# Patient Record
Sex: MALE | Race: WHITE | Employment: UNEMPLOYED | URBAN - METROPOLITAN AREA
[De-identification: names, ages, dates, MRNs, and addresses within clinical notes are randomized per-mention and may not be internally consistent; named-entity substitution may affect disease eponyms.]

---

## 2017-04-28 ENCOUNTER — HOSPITAL ENCOUNTER (EMERGENCY)
Facility: HOSPITAL | Age: 27
Discharge: HOME | End: 2017-04-29
Attending: EMERGENCY MEDICINE
Payer: MEDICAID

## 2017-04-28 VITALS
DIASTOLIC BLOOD PRESSURE: 58 MMHG | WEIGHT: 150 LBS | SYSTOLIC BLOOD PRESSURE: 119 MMHG | BODY MASS INDEX: 21 KG/M2 | TEMPERATURE: 98 F | HEART RATE: 85 BPM | RESPIRATION RATE: 20 BRPM | OXYGEN SATURATION: 99 % | HEIGHT: 71 IN

## 2017-04-28 DIAGNOSIS — R73.9 HYPERGLYCEMIA: Primary | ICD-10-CM

## 2017-04-28 PROCEDURE — 96372 THER/PROPH/DIAG INJ SC/IM: CPT

## 2017-04-28 PROCEDURE — 36415 COLL VENOUS BLD VENIPUNCTURE: CPT

## 2017-04-28 PROCEDURE — 80053 COMPREHEN METABOLIC PANEL: CPT | Performed by: EMERGENCY MEDICINE

## 2017-04-28 PROCEDURE — 82962 GLUCOSE BLOOD TEST: CPT

## 2017-04-28 PROCEDURE — 85025 COMPLETE CBC W/AUTO DIFF WBC: CPT | Performed by: EMERGENCY MEDICINE

## 2017-04-28 PROCEDURE — 81001 URINALYSIS AUTO W/SCOPE: CPT | Performed by: EMERGENCY MEDICINE

## 2017-04-28 PROCEDURE — 99285 EMERGENCY DEPT VISIT HI MDM: CPT

## 2017-04-28 PROCEDURE — 82009 KETONE BODYS QUAL: CPT | Performed by: EMERGENCY MEDICINE

## 2017-04-28 RX ORDER — INSULIN LISPRO 100 [IU]/ML
5 INJECTION, SOLUTION INTRAVENOUS; SUBCUTANEOUS
Qty: 10 ML | Refills: 0 | Status: SHIPPED | OUTPATIENT
Start: 2017-04-28 | End: 2017-04-28

## 2017-04-28 RX ORDER — INSULIN ASPART 100 [IU]/ML
0.2 INJECTION, SOLUTION INTRAVENOUS; SUBCUTANEOUS ONCE
Status: COMPLETED | OUTPATIENT
Start: 2017-04-28 | End: 2017-04-28

## 2017-04-28 RX ORDER — INSULIN LISPRO 100 [IU]/ML
5 INJECTION, SOLUTION INTRAVENOUS; SUBCUTANEOUS
Qty: 10 ML | Refills: 0 | Status: ON HOLD | OUTPATIENT
Start: 2017-04-28 | End: 2017-05-20

## 2017-04-29 PROCEDURE — 82962 GLUCOSE BLOOD TEST: CPT

## 2017-04-29 NOTE — ED PROVIDER NOTES
Patient Seen in: [facility] AND [facility] Emergency Department    History   Patient presents with:  Hyperglycemia (metabolic)    Stated Complaint: \"I need insulin\"    HPI    50-year-old male who is an insulin-dependent diabetic who currently takes 15 units of Cardiovascular: Normal rate, regular rhythm and intact distal pulses. Pulmonary/Chest: Effort normal. No respiratory distress. Clear breath sounds bilaterally. Abdominal: Soft. There is no tenderness. There is no guarding.    Musculoskeletal: Normal r DIFFERENTIAL[[id number]]          Abnormal            Final result                 Please view results for these tests on the individual orders. MDM     D/w Dr. Mena Venegas, rec'd admit as well. Pt adimantly does not want to stay. Understands risks.

## 2017-05-18 ENCOUNTER — HOSPITAL ENCOUNTER (INPATIENT)
Facility: HOSPITAL | Age: 27
LOS: 2 days | Discharge: HOME | DRG: 638 | End: 2017-05-20
Attending: EMERGENCY MEDICINE | Admitting: HOSPITALIST
Payer: MEDICAID

## 2017-05-18 DIAGNOSIS — E10.65 TYPE 1 DIABETES MELLITUS WITH HYPERGLYCEMIA (HCC): Primary | ICD-10-CM

## 2017-05-18 DIAGNOSIS — E10.10 DIABETIC KETOACIDOSIS WITHOUT COMA ASSOCIATED WITH TYPE 1 DIABETES MELLITUS (HCC): ICD-10-CM

## 2017-05-18 PROBLEM — E87.5 HYPERKALEMIA: Status: ACTIVE | Noted: 2017-05-18

## 2017-05-18 PROBLEM — E87.2 METABOLIC ACIDOSIS: Status: ACTIVE | Noted: 2017-05-18

## 2017-05-18 PROBLEM — R73.9 HYPERGLYCEMIA: Status: ACTIVE | Noted: 2017-05-18

## 2017-05-18 PROBLEM — E11.10 DKA (DIABETIC KETOACIDOSES): Status: ACTIVE | Noted: 2017-05-18

## 2017-05-18 PROCEDURE — 96365 THER/PROPH/DIAG IV INF INIT: CPT

## 2017-05-18 PROCEDURE — 96375 TX/PRO/DX INJ NEW DRUG ADDON: CPT

## 2017-05-18 PROCEDURE — 82009 KETONE BODYS QUAL: CPT | Performed by: EMERGENCY MEDICINE

## 2017-05-18 PROCEDURE — 83735 ASSAY OF MAGNESIUM: CPT | Performed by: INTERNAL MEDICINE

## 2017-05-18 PROCEDURE — 93010 ELECTROCARDIOGRAM REPORT: CPT | Performed by: EMERGENCY MEDICINE

## 2017-05-18 PROCEDURE — 85025 COMPLETE CBC W/AUTO DIFF WBC: CPT | Performed by: EMERGENCY MEDICINE

## 2017-05-18 PROCEDURE — 93005 ELECTROCARDIOGRAM TRACING: CPT

## 2017-05-18 PROCEDURE — 84484 ASSAY OF TROPONIN QUANT: CPT | Performed by: EMERGENCY MEDICINE

## 2017-05-18 PROCEDURE — 82962 GLUCOSE BLOOD TEST: CPT

## 2017-05-18 PROCEDURE — 81001 URINALYSIS AUTO W/SCOPE: CPT | Performed by: EMERGENCY MEDICINE

## 2017-05-18 PROCEDURE — 80048 BASIC METABOLIC PNL TOTAL CA: CPT | Performed by: INTERNAL MEDICINE

## 2017-05-18 PROCEDURE — 99285 EMERGENCY DEPT VISIT HI MDM: CPT

## 2017-05-18 PROCEDURE — 80307 DRUG TEST PRSMV CHEM ANLYZR: CPT | Performed by: HOSPITALIST

## 2017-05-18 PROCEDURE — 96361 HYDRATE IV INFUSION ADD-ON: CPT

## 2017-05-18 PROCEDURE — 80048 BASIC METABOLIC PNL TOTAL CA: CPT | Performed by: EMERGENCY MEDICINE

## 2017-05-18 PROCEDURE — 96366 THER/PROPH/DIAG IV INF ADDON: CPT

## 2017-05-18 RX ORDER — CALCIUM CARBONATE 200(500)MG
500 TABLET,CHEWABLE ORAL 3 TIMES DAILY PRN
Status: DISCONTINUED | OUTPATIENT
Start: 2017-05-18 | End: 2017-05-20

## 2017-05-18 RX ORDER — ONDANSETRON 2 MG/ML
4 INJECTION INTRAMUSCULAR; INTRAVENOUS EVERY 6 HOURS PRN
Status: DISCONTINUED | OUTPATIENT
Start: 2017-05-18 | End: 2017-05-20

## 2017-05-18 RX ORDER — POLYETHYLENE GLYCOL 3350 17 G/17G
17 POWDER, FOR SOLUTION ORAL DAILY PRN
Status: DISCONTINUED | OUTPATIENT
Start: 2017-05-18 | End: 2017-05-20

## 2017-05-18 RX ORDER — METOCLOPRAMIDE HYDROCHLORIDE 5 MG/ML
10 INJECTION INTRAMUSCULAR; INTRAVENOUS EVERY 8 HOURS PRN
Status: DISCONTINUED | OUTPATIENT
Start: 2017-05-18 | End: 2017-05-20

## 2017-05-18 RX ORDER — SODIUM CHLORIDE 9 MG/ML
INJECTION, SOLUTION INTRAVENOUS CONTINUOUS
Status: DISCONTINUED | OUTPATIENT
Start: 2017-05-18 | End: 2017-05-18

## 2017-05-18 RX ORDER — BISACODYL 10 MG
10 SUPPOSITORY, RECTAL RECTAL
Status: DISCONTINUED | OUTPATIENT
Start: 2017-05-18 | End: 2017-05-20

## 2017-05-18 RX ORDER — ONDANSETRON 2 MG/ML
4 INJECTION INTRAMUSCULAR; INTRAVENOUS ONCE
Status: COMPLETED | OUTPATIENT
Start: 2017-05-18 | End: 2017-05-18

## 2017-05-18 RX ORDER — HEPARIN SODIUM 5000 [USP'U]/ML
5000 INJECTION, SOLUTION INTRAVENOUS; SUBCUTANEOUS EVERY 8 HOURS SCHEDULED
Status: DISCONTINUED | OUTPATIENT
Start: 2017-05-18 | End: 2017-05-20

## 2017-05-18 RX ORDER — ACETAMINOPHEN 325 MG/1
650 TABLET ORAL EVERY 6 HOURS PRN
Status: DISCONTINUED | OUTPATIENT
Start: 2017-05-18 | End: 2017-05-20

## 2017-05-18 RX ORDER — SODIUM CHLORIDE AND POTASSIUM CHLORIDE .9; .15 G/100ML; G/100ML
SOLUTION INTRAVENOUS CONTINUOUS
Status: DISCONTINUED | OUTPATIENT
Start: 2017-05-18 | End: 2017-05-19

## 2017-05-18 RX ORDER — DOCUSATE SODIUM 100 MG/1
100 CAPSULE, LIQUID FILLED ORAL 2 TIMES DAILY PRN
Status: DISCONTINUED | OUTPATIENT
Start: 2017-05-18 | End: 2017-05-20

## 2017-05-18 NOTE — ED NOTES
Pt to ED c/o N/V, hyperglycemia since yesterday. Pt reports he has run out of insulin and has not used any in 2 days. Denies pain. NSR-ST on cardiac monitor. 0.9 NS infusing w/o.

## 2017-05-18 NOTE — PLAN OF CARE
Patient was acting suspicious about his back pack. He wanted the back pack with him in bed and when the PCT got the pt up to the bathroom, he asked for his back pack and the PCT gave it to him.  The pts IV was beeping and when I went to go check him in the

## 2017-05-18 NOTE — PLAN OF CARE
Diabetes/Glucose Control    • Glucose maintained within prescribed range Progressing        Patient/Family Goals    • Patient/Family Long Term Goal Progressing    • Patient/Family Short Term Goal Progressing          Pt on insulin drip, VSS, resting comfor

## 2017-05-18 NOTE — CONSULTS
[facility] HOSP - [facility]    Report of Endocrinology Consultation  [facility]    Zaira Kwan Patient Status:  Inpatient    2/10/1990 MRN [id number]   Location [facility] 2W/SW Attending Bimal Tuttle MD   [facility] Day # negative  Cardiovascular: negative  Gastrointestinal: nausea, vomiting  Genitourinary:negative  Integument/breast: negative  Hematologic/lymphatic: negative  Musculoskeletal:negative  Neurological: negative  Behavioral/Psych: negative  Endocrine: negative

## 2017-05-18 NOTE — DISCHARGE PLANNING
JUAREZ received for d/c planning. The patient was admitted to CCU today from home where he is self-care. He is insured and has access to medications and follow-up care. SW will assist with d/c planning pending recommendations.      Isis Hoover LCSW  x13

## 2017-05-18 NOTE — H&P
[facility] Hospitalist H&P     CC: Patient presents with:  Nausea/Vomiting/Diarrhea (gastrointestinal)       PCP: None Pcp, follows with clinic in [address], moving to the area soon, previously in [address]  Feels better after the insulin. Nothing made it worse. Otherwise denies pain.       Review of Systems  12 point systems reviewed, please see HPI for pertinent positives, otherwise negative    History     PMH  Past Medical History   Diagnosis Date   • Typ

## 2017-05-18 NOTE — ED INITIAL ASSESSMENT (HPI)
C/o \"im in dka\". States he has been vomiting since middle of night and has mid crampy abd pain. Denies fevers. Unknown bs at home.   Last checked bs 1 week ago

## 2017-05-19 PROCEDURE — 83036 HEMOGLOBIN GLYCOSYLATED A1C: CPT | Performed by: HOSPITALIST

## 2017-05-19 PROCEDURE — 80053 COMPREHEN METABOLIC PANEL: CPT | Performed by: HOSPITALIST

## 2017-05-19 PROCEDURE — 82962 GLUCOSE BLOOD TEST: CPT

## 2017-05-19 PROCEDURE — 83735 ASSAY OF MAGNESIUM: CPT | Performed by: INTERNAL MEDICINE

## 2017-05-19 PROCEDURE — 85025 COMPLETE CBC W/AUTO DIFF WBC: CPT | Performed by: HOSPITALIST

## 2017-05-19 RX ORDER — NICOTINE 21 MG/24HR
1 PATCH, TRANSDERMAL 24 HOURS TRANSDERMAL DAILY
Status: DISCONTINUED | OUTPATIENT
Start: 2017-05-19 | End: 2017-05-20

## 2017-05-19 RX ORDER — DEXTROSE AND SODIUM CHLORIDE 5; .45 G/100ML; G/100ML
INJECTION, SOLUTION INTRAVENOUS CONTINUOUS
Status: DISCONTINUED | OUTPATIENT
Start: 2017-05-19 | End: 2017-05-20

## 2017-05-19 NOTE — PAYOR COMM NOTE
Attending Physician: Lucero Thompson MD    Review Type: ADMISSION   Reviewer: Cailin Mast       Date: May 19, 2017 - 11:35 AM  Payor: LATOYA  Authorization Number: [id number]  Admit date: 5/18/2017  7:28 AM   Admitted from Emergency Dept.:     ED Proider N while in house    Patient and/or patient's family given opportunity to ask questions and note understanding and agreeing with therapeutic plan as outlined    Thank You,  Buffy Knowles MD  [facility] Hospitalist    Answering Service number: [phone]    HPI Psych: mood stable, cooperative  Neuro: No focal deficits    Diagnostic Data:    CBC/Chem  Recent Labs   Lab  05/18/17   0740   WBC  17.2*   HGB  14.4   MCV  91.8   PLT  271       Recent Labs   Lab  05/18/17   0740   NA  136   K  5.2*   CL  95   CO2  9* AM  -NPO    PPX;SCD, hep SQ    Dipso pending clinical course    Full code, confirmed    Prior ER records reviewed confirming patient's medical history and medications. Further recommendations pending patient's clinical course.   [facility] hospitalist to [facility] with regular rate and rhythm, No murmurs, rubs, gallops  Abd: Abdomen soft, nontender, nondistended, no organomegaly, bowel sounds present  MSK: Full range of motion in extremities, no clubbing, no cyanosis.   No Lower extremity edema  Skin: no rashes or le Heparin Sodium (Porcine) 5000 UNIT/ML injection 5,000 Units, 5,000 Units, Subcutaneous, Q8H [address]  •  docusate sodium (COLACE) cap 100 mg, 100 mg, Oral, BID PRN  •  PEG 3350 (MIRALAX) powder packet 17 g, 17 g, Oral, Daily PRN  •  bisacodyl (DULCOLAX) rectal Non-compliance  4. Elevated WBC  5.  Renal insufficiency    Plan:    IV saline as ordered  IV insulin  Electrolyte monitoring and replacement  D5 when glucose below 200    Thank you        Mariam Fry MD        5/19  Lissette Rodriguez is a 33year old m (37.4 °C)] 99.3 °F (37.4 °C)  Pulse:  [] 104  Resp:  [14-27] 25  BP: (115-147)/(64-94) 136/88 mmHg    Intake/Output:    Intake/Output Summary (Last 24 hours) at 05/19/17 0845  Last data filed at 05/19/17 0600    Gross per 24 hour    Intake    4278 ml  181*          Recent Labs    Lab  05/18/17   0740    TROP   0.01        Imaging:  No results found.

## 2017-05-19 NOTE — PLAN OF CARE
Diabetes/Glucose Control    • Glucose maintained within prescribed range Progressing    Pt remains on the insulin drip and titrated per protocol. accucheck done every hour.     METABOLIC/FLUID AND ELECTROLYTES - ADULT    • Electrolytes maintained within nor

## 2017-05-19 NOTE — DISCHARGE PLANNING
SEVERINO met with the patient at bedside. He lives with his girlfriend and is unemployed. They are planning to move to the area and he is interested free/discounted clinics and SEVERINO provided him with information on the [address].   He currently is seeing

## 2017-05-19 NOTE — PROGRESS NOTES
[facility] Hospitalist Progress Note     PCP: None Pcp->has someone but in [place], now follows with clinic in [place] [address], would like info on free clinics here    CC: Follow up       Assessment/Plan:       Wesly Maloney is a 33year old male with PMH sig f °C)  Pulse:  [] 104  Resp:  [14-27] 25  BP: (115-147)/(64-94) 136/88 mmHg    Intake/Output:    Intake/Output Summary (Last 24 hours) at 05/19/17 0845  Last data filed at 05/19/17 0600   Gross per 24 hour   Intake   4278 ml   Output   2200 ml   Net

## 2017-05-19 NOTE — PROGRESS NOTES
[facility]   Diabetes Education Note    [place]  Patient Status Inpatient    2/10/1990  Location [facility] 2W/SW  Attending Dorian Double, [address] Day # 1  PCP  None Pcp      Reason for Visit:  Diabetic Tad Alvares

## 2017-05-19 NOTE — PLAN OF CARE
Diabetes/Glucose Control    • Glucose maintained within prescribed range  Continue on insulin drip Progressing        METABOLIC/FLUID AND ELECTROLYTES - ADULT    • Electrolytes maintained within normal limits Progressing        SKIN/TISSUE INTEGRITY - ADUL

## 2017-05-19 NOTE — PROGRESS NOTES
[facility]    Endocrine Progress Note  [facility]    Kirstie Johnson Patient Status:  Inpatient    2/10/1990 MRN [id number]   Location [facility] 2W/SW Attending Kimberly Castaneda MD   Hosp Day # 1 PCP None Pcp warm        Results:     Lab Results  Component Value Date   WBC 14.4* 05/19/2017   HGB 13.9 05/19/2017   HCT 41.4 05/19/2017    05/19/2017   CREATSERUM 1.17 05/19/2017   BUN 11 05/19/2017    05/19/2017   K 4.0 05/19/2017    05/19/2017

## 2017-05-20 VITALS
OXYGEN SATURATION: 100 % | DIASTOLIC BLOOD PRESSURE: 82 MMHG | BODY MASS INDEX: 20 KG/M2 | TEMPERATURE: 99 F | HEART RATE: 91 BPM | RESPIRATION RATE: 27 BRPM | WEIGHT: 146.69 LBS | SYSTOLIC BLOOD PRESSURE: 131 MMHG

## 2017-05-20 PROCEDURE — 85025 COMPLETE CBC W/AUTO DIFF WBC: CPT | Performed by: HOSPITALIST

## 2017-05-20 PROCEDURE — 82962 GLUCOSE BLOOD TEST: CPT

## 2017-05-20 PROCEDURE — 80048 BASIC METABOLIC PNL TOTAL CA: CPT | Performed by: HOSPITALIST

## 2017-05-20 RX ORDER — SODIUM CHLORIDE AND POTASSIUM CHLORIDE .9; .15 G/100ML; G/100ML
SOLUTION INTRAVENOUS CONTINUOUS
Status: DISCONTINUED | OUTPATIENT
Start: 2017-05-20 | End: 2017-05-20

## 2017-05-20 RX ORDER — 0.9 % SODIUM CHLORIDE 0.9 %
VIAL (ML) INJECTION
Status: COMPLETED
Start: 2017-05-20 | End: 2017-05-20

## 2017-05-20 RX ORDER — INSULIN LISPRO 100 [IU]/ML
8 INJECTION, SOLUTION INTRAVENOUS; SUBCUTANEOUS
Qty: 10 ML | Refills: 0 | Status: SHIPPED | OUTPATIENT
Start: 2017-05-20

## 2017-05-20 RX ORDER — DEXTROSE MONOHYDRATE 25 G/50ML
50 INJECTION, SOLUTION INTRAVENOUS AS NEEDED
Status: DISCONTINUED | OUTPATIENT
Start: 2017-05-20 | End: 2017-05-20

## 2017-05-20 RX ORDER — INSULIN LISPRO 100 [IU]/ML
10 INJECTION, SOLUTION INTRAVENOUS; SUBCUTANEOUS
Qty: 10 ML | Refills: 0 | Status: SHIPPED | OUTPATIENT
Start: 2017-05-20 | End: 2017-05-20

## 2017-05-20 RX ORDER — SODIUM CHLORIDE 9 MG/ML
INJECTION, SOLUTION INTRAVENOUS CONTINUOUS
Status: DISCONTINUED | OUTPATIENT
Start: 2017-05-20 | End: 2017-05-20

## 2017-05-20 RX ORDER — POTASSIUM CHLORIDE 20 MEQ/1
40 TABLET, EXTENDED RELEASE ORAL EVERY 4 HOURS
Status: DISCONTINUED | OUTPATIENT
Start: 2017-05-20 | End: 2017-05-20

## 2017-05-20 NOTE — PLAN OF CARE
Diabetes/Glucose Control    • Glucose maintained within prescribed range Completed        METABOLIC/FLUID AND ELECTROLYTES - ADULT    • Electrolytes maintained within normal limits Completed        Patient/Family Goals    • Patient/Family Long Term Goal Co

## 2017-05-20 NOTE — PLAN OF CARE
Diabetes/Glucose Control    • Glucose maintained within prescribed range Progressing        METABOLIC/FLUID AND ELECTROLYTES - ADULT    • Electrolytes maintained within normal limits Progressing        Patient/Family Goals    • [facility]

## 2017-05-20 NOTE — PROGRESS NOTES
[facility] Hospitalist Progress Note     PCP: None Pcp->has someone but in [place], now follows with clinic in [place], would like info on free clinics here    CC: Follow up       Assessment/Plan:       Justin Acuña is a 33year old male with PMH sig f house    Patient and/or patient's family given opportunity to ask questions and note understanding and agreeing with therapeutic plan as outlined    Thank You,  Dhara Abreu MD  [facility] Hospitalist    Answering Service number: [phone]        Subjective HGB  14.4  13.9  13.9   MCV  91.8  88.2  87.7   PLT  271  260  209       Recent Labs   Lab  05/18/17   0740  05/18/17   1828  05/19/17   0601  05/19/17   1757  05/20/17   0422   NA  136  139  138  138  141   K  5.2*  4.3  4.0  3.7  3.1*   CL  95  106  10

## 2017-05-20 NOTE — PROGRESS NOTES
[facility]    Endocrine Progress Note  [facility]    Idalia Ronda Patient Status:  Inpatient    2/10/1990 MRN [id number]   Location [facility] 2W/SW Attending Letha Brooks MD   Hosp Day # 2 PCP None Pcp 90, temperature 99.2 °F (37.3 °C), temperature source Temporal, resp. rate 27, weight 146 lb 11.2 oz (66.543 kg), SpO2 99 %.     Physical Exam:  General appearance: alert, appears stated age and cooperative  Neck: supple,no thyromegaly  Pulmonary:  clear to

## 2017-05-20 NOTE — PLAN OF CARE
Diabetes/Glucose Control    • Glucose maintained within prescribed range Progressing        METABOLIC/FLUID AND ELECTROLYTES - ADULT    • Electrolytes maintained within normal limits Progressing        Insulin drip d/c'd. Accuchecks QID.  Continue to [facility]

## 2017-05-23 NOTE — PAYOR COMM NOTE
PLEASE GIVE TO DR. IQBAL, THIS IS FOR RECONSIDERATION. SHE HAD SPOKEN TO MY P.A.DR. PARK & SAID WE COULD SUMBIT ADDITIONAL INFORMATION. THIS IS THE D/C SUMMARY INFO.  I WILL SUMBIT CON'T STAY IN THE NEXT FAX  [address] YOU!  [facility] RN UR/CM  [phone]    shakiness on exam  -no CP or SOB    Concern for illicits  -was found in bathroom by RN shoving something into his bag and seemed high after  -security did search, found empty insulin bottle and syringes/needles  -d/w Rn, will monitor closely.  UDS is + for sounds present  MSK:  no clubbing, no cyanosis.  No Lower extremity edema  Skin: no rashes or lesions, well perfused  Psych: mood stable, cooperative  Neuro: no focal deficits      Medications        •  Potassium Chloride ER   40 mEq  Oral  Q4H    •  insul much better  Transitioned to 35 units of Levemir    Novolog 10  Units tid AC and medium dose ss  OK to discharge later tonight      Subjective:    Much better    Medications:    Current facility-administered medications:    •  Potassium Chloride ER (K-DUR masses,  no organomegaly  Extremities: no cyanosis or edema, pulses 2+ and symmetric  Skin: Skin color, texture, turgor normal. No rashes or lesions  Neurologic: Alert and oriented X 3, no focal deficit      Results:      Lab Results  Component  Value  Juan Miguel

## 2017-05-23 NOTE — PAYOR COMM NOTE
THIS IS FOR DR. IQBAL, IT'S FOR RECONSIDERATION. I'M UNABLE TO SUBMIT THE ACCUCHECKS   FOR THE CASE. I'M SORRY ABOUT THAT. BUT, I HOPE I HAVE ENOUGH HERE FOR RECONSIDERATION.       Insulin Regular Human (NOVOLIN R) 100 Units in sodium chloride 0.9 % 100 m 4. Elevated WBC  5.  Renal insufficiency    Plan:    Acidosis is better  Will repeat BMP now to reassess  Continue IV insulin gtt    Subjective:    Feels better, no nausea or vomiting    Medications:    Current facility-administered medications:    •  cyndir GLU  141*  05/19/2017    CA  9.8  05/19/2017    ALB  3.9  05/19/2017    ALKPHO  121*  05/19/2017    BILT  1.9*  05/19/2017    TP  7.6  05/19/2017    AST  19  05/19/2017    ALT  14*  05/19/2017    MG  1.9  05/19/2017    TROP  0.01  05/18/2017            Ekg Blood  05/20/17 1134            Components       Value Reference Range Flag Lab   POC Glucose  127 70-99  [facility]              POCT GLUCOSE [[id number]] (Abnormal)  Resulted: 05/20/17 1112, Result status: Final result     Resulting lab: [facility] Value Reference Range Flag Lab   POC Glucose  162 70-99  [facility]              POCT GLUCOSE [[id number]] (Abnormal)  Resulted: 05/20/17 0604, Result status: Final result     Resulting lab: [facility]      Specimen Information    Type Source Collect

## 2017-05-24 NOTE — ED PROVIDER NOTES
Patient Seen in: [facility] AND CLINICS 2w/sw    History   Patient presents with:  Nausea/Vomiting/Diarrhea (gastrointestinal)    Stated Complaint: vomiting     HPI    Patient is a 33year old male who presents to the ER complaining of nausea and vomiting for 05/18/17 0726 98 %   O2 Device 05/18/17 0758 None (Room air)       Current:/82 mmHg  Pulse 91  Temp(Src) 99 °F (37.2 °C) (Temporal)  Resp 27  Wt 66.543 kg  SpO2 100%        Physical Exam   Constitutional: He is oriented to person, place, and time.  He (14) - Abnormal; Notable for the following:     Glucose 141 (*)     CO2 12 (*)     ALT 14 (*)     Alkaline Phosphatase 121 (*)     Bilirubin, Total 1.9 (*)     Anion Gap 19 (*)     BUN/CREA Ratio 9.4 (*)     All other components within normal limits   HEMO following:     POC Glucose  169 (*)     All other components within normal limits   POCT GLUCOSE - Abnormal; Notable for the following:     POC Glucose  173 (*)     All other components within normal limits   POCT GLUCOSE - Abnormal; Notable for the follow limits   POCT GLUCOSE - Abnormal; Notable for the following:     POC Glucose  202 (*)     All other components within normal limits   POCT GLUCOSE - Abnormal; Notable for the following:     POC Glucose  123 (*)     All other components within normal limits Glucose  152 (*)     All other components within normal limits   POCT GLUCOSE - Abnormal; Notable for the following:     POC Glucose  135 (*)     All other components within normal limits   POCT GLUCOSE - Abnormal; Notable for the following:     POC Glucos GLUCOSE - Abnormal; Notable for the following:     POC Glucose  115 (*)     All other components within normal limits   POCT GLUCOSE - Abnormal; Notable for the following:     POC Glucose  151 (*)     All other components within normal limits   CBC W/ DIFF DIFFERENTIAL WITH PLATELET.   Procedure                               Abnormality         Status                     ---------                               -----------         ------                     CBC W/ DIFFERENTIAL[[id number]]          Abnormal 5/18/2017 Unknown    DKA (diabetic ketoacidoses) ([facility] E13.10 5/18/2017 Unknown    Hyperglycemia R73.9 5/18/2017 Yes    Hyperkalemia E87.5 1/07/2090 Yes    Metabolic acidosis M30.5 0/79/7308 Yes

## 2017-05-27 NOTE — DISCHARGE SUMMARY
[facility] Internal Medicine Discharge Summary   Patient ID:  Aleksander Benitez  [id number]  71 year old  2/10/1990    Admit date: 5/18/2017    Discharge date and time: 5/20/2017  5:20 PM     Attending Physician: No att. providers found     [employer] Misc          Discharge Diagnoses:  Type one diabetes, assumed poorly controlled, presenting in DKA due to insulin non compliance  -BS in the 600's, + ketones on admission, now much improved  -per ER note was on NPH 7 BID and 5 units AC, pt understand and agree with therapeutic plan as outlined

## 2017-11-27 NOTE — ED PROVIDER NOTES
Patient Seen in: [facility] AND [facility] Emergency Department    History   Patient presents with:  Overdose  Drug Problem      HPI    The patient presents via EMS after a heroin overdose at home tonight.   EMS was called by  another individual and police arrive well-developed and well-nourished. No distress. HENT:   Head: Normocephalic and atraumatic. Eyes: Conjunctivae are normal. Right eye exhibits no discharge. Left eye exhibits no discharge. Neck: No tracheal deviation present.    Cardiovascular: Intact SpO2: 98% 97% 97% 97%   Weight:       Height:         *I personally reviewed and interpreted all ED vitals.     Pulse Ox: 97%, Room air, Normal     Monitor Interpretation:   normal sinus rhythm, sinus tachycardia    Differential Diagnosis/ Diagnostic Cons

## 2017-11-27 NOTE — ED NOTES
Pt searched and found 2 hypodermic needles and white/gray powdery substance the pt states is \"drugs\". Security called at this time for a second search and seizure of possible controlled substance. 2 needles were disposed in the sharps container.

## 2017-11-27 NOTE — ED INITIAL ASSESSMENT (HPI)
Heroin OD, found at home. Given 4mg Narcan IN. Now presents awake/alert and oriented x4. No complaints at this time.

## 2019-02-15 NOTE — ED PROVIDER NOTES
Patient Seen in: [facility] AND [facility] Emergency Department    History   Patient presents with:  Poisoning/Overdose (metabolic, psychiatric)    Stated Complaint:     HPI    29 yo male with h/o IV heroin abuse presents after accidental heroin overdose.  Mame Romero range of motion. He exhibits no edema or tenderness. Lymphadenopathy:     He has no cervical adenopathy. Neurological: He is alert and oriented to person, place, and time. No cranial nerve deficit. Skin: Skin is warm and dry.    Multiple venipuncture

## 2019-02-15 NOTE — BH LEVEL OF CARE ASSESSMENT
Level of Care Assessment Note    General Questions  Why are you here?: \"I overdosed. \"   Precipitating Events: Pt overdosed on heroin this evening. Pt denies SI and states he was try trying to get high, but must have overdone it.    History of Present Illn or thought about it?: No    Access to Means  Has access to means to attempt suicide or harm others or property: No  Discussion of Removal of Access to Means: Pt denies SI and HI   Access to Firearm/Weapon: No  Discussion of Removal of Firearm/Weapon: N/A being a weekly habit.    How long with this pattern of use?: Pt uncertain   Last Use?: Today   Longest period of sobriety/abstinence?: Unknown   Is your current use the most/worst it has ever been? : Yes                        Support for Recovery  Is your Speech: Appropriate  Intensity of Volume: Ordinary  Clarity: Clear  Cognition  Concentration: Unimpaired(Pt was able to focus for entire assessment)  Memory: Recent memory intact; Remote memory intact  Orientation Level: Oriented X4  Insight: Good  Judgment to call if condition worsens; Advised to call with questions  Transferred: No    Primary Psychiatric Diagnosis  Substance Related and Addictive Disorders: Opioid-Related Disorder - Opioid use Disorder

## 2019-02-15 NOTE — ED NOTES
Report received from [facility]. Patient resting on cart with cardiac monitor in place. No distress at this time, will continue to monitor.

## 2022-05-20 NOTE — ED INITIAL ASSESSMENT (HPI)
Overdose from shooting heroin, narcan given by ems, a/o x3 on arrive Detail Level: Detailed Detail Level: Zone

## (undated) NOTE — ED AVS SNAPSHOT
[facility] Emergency Department    [place] [address]     [place] [address]    Phone:  [phone]    Fax:  [phone]           Holger Pathak   MRN: [id number]    Department:  [facility] Emergency Department   Date of Visit: Insurance plans vary and the physician(s) referred by the ER may not be covered by your plan. Please contact your insurance company to determine coverage and benefits available for follow-up care and referrals.       If you have difficulty scheduling your prescription right away and begin taking the medication(s) as directed.   If you believe that any of the medications or instructions on this list is different from what your Primary Care doctor has instructed you - please continue to take your medications a Patient [address] to help you get signed up for insurance coverage. Patient [address] is a Federal Navigator program that can help with your Affordable Care Act coverage, as well as all types of Medicaid plans.   To get signed up and covere

## (undated) NOTE — ED AVS SNAPSHOT
[facility] Emergency Department    [address]     [address] [place] [address]    Phone:  [phone]    Fax:  [phone]           Jessica Hathaway   MRN: [id number]    Department:  [facility] Emergency Department   Date of Visit: and Class Registration line at [phone] or find a doctor online by visiting [email or web address].    IF THERE IS ANY CHANGE OR WORSENING OF YOUR CONDITION, CALL YOUR PRIMARY CARE PHYSICIAN AT ONCE OR RETURN IMMEDIATELY TO [address] [place] [address].     If

## (undated) NOTE — ED AVS SNAPSHOT
Kirstie Johnson   MRN: [id number]    Department:  [facility] Emergency Department   Date of Visit:  11/27/2017           Disclosure     Insurance plans vary and the physician(s) referred by the ER may not be covered by your plan.  Please co CARE PHYSICIAN AT ONCE OR RETURN IMMEDIATELY TO THE EMERGENCY DEPARTMENT. If you have been prescribed any medication(s), please fill your prescription right away and begin taking the medication(s) as directed.   If you believe that any of the medications

## (undated) NOTE — ED AVS SNAPSHOT
María Elena Zain   MRN: [id number]    Department:  [facility] Emergency Department   Date of Visit:  2/15/2019           Disclosure     Insurance plans vary and the physician(s) referred by the ER may not be covered by your plan.  Please con CARE PHYSICIAN AT ONCE OR RETURN IMMEDIATELY TO THE EMERGENCY DEPARTMENT. If you have been prescribed any medication(s), please fill your prescription right away and begin taking the medication(s) as directed.   If you believe that any of the medications

## (undated) NOTE — IP AVS SNAPSHOT
[address]  [address] [facility] [phone]                Discharge Summary   5/18/2017    Chasity Bowen           Admission Information        Provider Department    5/18/2017 Shanti Sepulveda Last time this was given:  35 Units on 5/20/2017  1:28 PM   Commonly known as:  LEVEMIR   Start taking on:  5/21/2017   What changed:    - how much to take  - when to take this        Inject 35 Units into the skin daily.     Linda Gutierrez nutrition, medication management, exercise).  Individual if class is not available    Note:   Medicare covers 10 hours initial DSMT (Diabetes Self-Management Training) in a 12-month period for the first year of diagnosis, plus 2 hours follow-up DSMT annuall 260 (05/19/17)  9.3    (05/18/17)  17.2 (H) (05/18/17)  4.80 (05/18/17)  14.4 (05/18/17)  44.1 (05/18/17)  91.8    (05/18/17)  271 (05/18/17)  10.1      Recent Hematology Lab Results (cont.)  (Last 3 results in the past 90 days)    Neutrophil % Lymphocyte - If you have concerns related to behavioral health issues or thoughts of harming yourself, contact [address] [place] [address] at [phone].     - If you don’t have insurance, Patricia Arias has partnered with [facility] [address] [facility] call your provider or healthcare team if you have any questions regarding your medications while at home.          Blood Sugar Medications     insulin detemir 100 UNIT/ML Subcutaneous Solution Pen-injector (Starting on 5/21/2017)    Insulin Lispro 100 UNIT/